# Patient Record
Sex: MALE | Race: BLACK OR AFRICAN AMERICAN
[De-identification: names, ages, dates, MRNs, and addresses within clinical notes are randomized per-mention and may not be internally consistent; named-entity substitution may affect disease eponyms.]

---

## 2019-06-22 ENCOUNTER — HOSPITAL ENCOUNTER (EMERGENCY)
Dept: HOSPITAL 10 - E/R | Age: 64
Discharge: LEFT BEFORE DISCHARGE | End: 2019-06-22
Payer: SELF-PAY

## 2019-06-22 ENCOUNTER — HOSPITAL ENCOUNTER (EMERGENCY)
Dept: HOSPITAL 91 - E/R | Age: 64
Discharge: LEFT BEFORE DISCHARGE | End: 2019-06-22
Payer: SELF-PAY

## 2019-06-22 VITALS — DIASTOLIC BLOOD PRESSURE: 98 MMHG | HEART RATE: 88 BPM | RESPIRATION RATE: 19 BRPM | SYSTOLIC BLOOD PRESSURE: 205 MMHG

## 2019-06-22 VITALS
BODY MASS INDEX: 33.91 KG/M2 | WEIGHT: 216.05 LBS | BODY MASS INDEX: 33.91 KG/M2 | WEIGHT: 216.05 LBS | HEIGHT: 67 IN | HEIGHT: 67 IN

## 2019-06-22 DIAGNOSIS — R07.9: Primary | ICD-10-CM

## 2019-06-22 DIAGNOSIS — I25.2: ICD-10-CM

## 2019-06-22 DIAGNOSIS — Z95.1: ICD-10-CM

## 2019-06-22 PROCEDURE — 99283 EMERGENCY DEPT VISIT LOW MDM: CPT

## 2019-06-22 NOTE — ERD
ER Documentation


Chief Complaint


Chief Complaint





bib ra from home for cp, patient fell 30 min pta, hx of mi





HPI


This is a 64-year-old male who is here for chest pain.  He said he fell earlier 


because he tripped and fell on his right arm but did not get knocked out and has


no neck pain nor did he fall on his chest or abdomen.  He said they are going to


[employer] [facility] to get some aspirin for his pain in all he was in the car 


he developed some substernal chest pressure lasted 45 minutes and went away.  He


says he feels fine now.  He only took some baby aspirin and no nitroglycerin 


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  He had no shortness of breath, no diaphoresis palpitations nausea


vomiting





ROS


All systems reviewed and are negative except as per history of present illness.





PMhx/Soc


History of Surgery:  Yes (MI, sternal removal )


Anesthesia Reaction:  No


Hx Neurological Disorder:  No


Hx Respiratory Disorders:  No


Hx Cardiac Disorders:  Yes


Hx Psychiatric Problems:  No


Hx Miscellaneous Medical Probl:  No


Hx Alcohol Use:  No


Hx Substance Use:  No


Smoking Status:  Never smoker





FmHx


Family History:  No coronary disease





Physical Exam


Vitals





Vital Signs


  Date      Temp  Pulse  Resp  B/P (MAP)   Pulse Ox  O2          O2 Flow    FiO2


Time                                                 Delivery    Rate


   6/22/19  98.5     98    19     211/115       100


     22:37                          (147)


   6/22/19  98.5     88    19      205/98       100  Room Air


     22:37                          (133)





Physical Exam


 Const:      Well-developed, well-nourished


 Head:        Atraumatic, normocephalic


 Eyes:       Normal Conjunctiva, PERRLA, EOMI, normal sclera, no nystagmus


 ENT:         Normal External Ears, Nose and Mouth, moist mucus membranes.


 Neck:        Full range of motion.  No meningismus, no lymphadenopathy.


 Resp:         Clear to auscultation bilaterally, no wheezing, rhonchi, rales


 Cardio:       Regular rate and rhythm, no murmurs, S1 S2 present


 Abd:         Soft, non tender x 4, non distended. Normal bowel sounds, no 


guarding or rebound, no pulsitile abdominal masses or bruits


 Skin:         No petechiae or rashes, no ecchymosis , no maculopapular rash


 Back:        No midline or flank tenderness


 Ext:          No cyanosis, or edema, FROM x 4, normal inspection, 


neurovascularly intact x 4


 Neur:        Awake and alert, STR 5/5 x 4, sensation intact x 4, no focal 


findings, cerebellum intact


 Psych:        Normal Mood and Affect





Procedures/MDM


Twelve-lead by EMS shows sinus tachycardia.  Monitor shows heart rate of 86.





No EKG was done because he does not want any work-up done here.





The patient wants to leave with his family member and go to [facility] were all of his


doctors are.





He says he is going to sign out AGAINST MEDICAL ADVICE this was told the risk of


leaving AGAINST MEDICAL ADVICE including death.  Patient says he understands and


wants to leave he has a family member here who will drive him.  Patient is awake


and alert and coherent to make his own decisions





Departure


Diagnosis:  


   Primary Impression:  


   Chest pain


   Chest pain type:  unspecified  Qualified Codes:  R07.9 - Chest pain, 


   unspecified


Condition:  Stable











DAX HERNANDEZ DO         Jun 22, 2019 22:45